# Patient Record
Sex: FEMALE | Race: WHITE | NOT HISPANIC OR LATINO | Employment: FULL TIME | URBAN - METROPOLITAN AREA
[De-identification: names, ages, dates, MRNs, and addresses within clinical notes are randomized per-mention and may not be internally consistent; named-entity substitution may affect disease eponyms.]

---

## 2021-05-25 ENCOUNTER — RECORDS - HEALTHEAST (OUTPATIENT)
Dept: ADMINISTRATIVE | Facility: CLINIC | Age: 36
End: 2021-05-25

## 2023-02-27 ENCOUNTER — THERAPY VISIT (OUTPATIENT)
Dept: PHYSICAL THERAPY | Facility: CLINIC | Age: 38
End: 2023-02-27
Payer: COMMERCIAL

## 2023-02-27 DIAGNOSIS — M25.551 HIP PAIN, RIGHT: Primary | ICD-10-CM

## 2023-02-27 DIAGNOSIS — S76.319A HAMSTRING MUSCLE STRAIN: ICD-10-CM

## 2023-02-27 PROCEDURE — 97530 THERAPEUTIC ACTIVITIES: CPT | Mod: GP | Performed by: PHYSICAL THERAPIST

## 2023-02-27 PROCEDURE — 97161 PT EVAL LOW COMPLEX 20 MIN: CPT | Mod: GP | Performed by: PHYSICAL THERAPIST

## 2023-02-27 PROCEDURE — 97110 THERAPEUTIC EXERCISES: CPT | Mod: GP | Performed by: PHYSICAL THERAPIST

## 2023-02-27 NOTE — PROGRESS NOTES
"Physical Therapy Initial Evaluation  Subjective:  The history is provided by the patient. No  was used.   Patient Health History  Marnie Arzola being seen for Patient presents as a self-referral with primary complaint of righthip and bilateral hamstring pain. She previously went to PT online and was able to progress back to running over the summer but reaggravated the hamstrings and hip pain continues. Pain can come on as quickly as 1-2 minutes into running in both hip and hamstrings..     Problem began: 7/1/2022.   Problem occurred: Running injury, ongoing with previous occurrences    Pain is reported as 2/10 on pain scale.  General health as reported by patient is good.  Pertinent medical history includes: overweight.   Red flags:  None as reported by patient.     Surgeries include:  Other. Other surgery history details: breast reduction.    Current medications:  Other. Other medications details: birth control.    Current occupation is .                     Therapist Generated HPI Evaluation  Problem details: Patient is an avid runner, enjoys walking, biking and hiking and does strength training in the evenings (started a few months ago). She only has pain with running and walking.    0-1 pain level at rest  2-4/10 with movement this week.         Type of problem:  Right hip.    This is a chronic condition.  Condition occurred with:  Running.  Where condition occurred: during recreation/sport.  Patient reports pain:  Greater trochanter and lateral.  Pain quality: grinding, \"inflamed\"   Pain radiates to:  No radiation. Pain timing: worse with activity.  Since onset symptoms are unchanged.  Associated symptoms:  Loss of motion/stiffness. Symptoms are exacerbated by running      Previous treatment includes physical therapy (online PT). There was moderate improvement following previous treatment.  Restrictions due to condition include:  Working in normal job without " restrictions.  Barriers include:  None as reported by patient.                        Objective:  System                                           Hip Evaluation  HIP AROM:  AROM:    Left Hip:     Normal    Right Hip:   Normal (mild anterior hip pain with IR (PROM>AROM); all other AROM normal)                      Hip Special Testing:      Right hip negative for the following special tests:  Piriformis; Bravo or Fadir/Labrum    Hip Palpation:      Right hip tenderness present at:  ASIS; Abductors and Gluteus Medius  Right hip tenderness not present at:  Ischial Tuberosity; hip flexors or Piriformis  Functional Testing:          Quad:    Single leg squat:   Left:    Mild loss of control  Right:   Moderate loss of control, hip substitution, femoral IR and excessive anterior knee excursion    Bilateral leg squat:  Normal control            Knee Evaluation:              Functional Testing:        Core Strength:    Single Leg Bridge: Left:  12/20 reps    Right: 12/20 reps    % of Uninvolved:             General     ROS    Assessment/Plan:    Patient is a 37 year old female with right side hip complaints.    Patient has the following significant findings with corresponding treatment plan.                Diagnosis 1:  R hip pain, gluteal tendinopathy vs. Hip bursitis, bilateral hamstring pain  Pain -  hot/cold therapy, manual therapy, splint/taping/bracing/orthotics, self management, education and home program  Decreased ROM/flexibility - manual therapy, therapeutic exercise and home program  Decreased joint mobility - manual therapy, therapeutic exercise and home program  Decreased strength - therapeutic exercise, therapeutic activities and home program  Impaired muscle performance - neuro re-education and home program  Decreased function - therapeutic activities and home program    Therapy Evaluation Codes:   Cumulative Therapy Evaluation is: Low complexity.    Previous and current functional limitations:  (See Goal Flow  Sheet for this information)    Short term and Long term goals: (See Goal Flow Sheet for this information)     Communication ability:  Patient appears to be able to clearly communicate and understand verbal and written communication and follow directions correctly.  Treatment Explanation - The following has been discussed with the patient:   RX ordered/plan of care  Anticipated outcomes  Possible risks and side effects  This patient would benefit from PT intervention to resume normal activities.   Rehab potential is good.    Frequency:  2 X a month, once daily  Duration:  for 6 visits  Discharge Plan:  Achieve all LTG.  Independent in home treatment program.  Reach maximal therapeutic benefit.    Please refer to the daily flowsheet for treatment today, total treatment time and time spent performing 1:1 timed codes.

## 2023-04-12 ENCOUNTER — THERAPY VISIT (OUTPATIENT)
Dept: PHYSICAL THERAPY | Facility: CLINIC | Age: 38
End: 2023-04-12
Payer: COMMERCIAL

## 2023-04-12 DIAGNOSIS — M25.551 HIP PAIN, RIGHT: Primary | ICD-10-CM

## 2023-04-12 DIAGNOSIS — S76.319A HAMSTRING MUSCLE STRAIN: ICD-10-CM

## 2023-04-12 PROCEDURE — 97535 SELF CARE MNGMENT TRAINING: CPT | Mod: GP | Performed by: PHYSICAL THERAPIST

## 2023-04-12 PROCEDURE — 97110 THERAPEUTIC EXERCISES: CPT | Mod: GP | Performed by: PHYSICAL THERAPIST

## 2023-04-27 ENCOUNTER — THERAPY VISIT (OUTPATIENT)
Dept: PHYSICAL THERAPY | Facility: CLINIC | Age: 38
End: 2023-04-27
Payer: COMMERCIAL

## 2023-04-27 DIAGNOSIS — S76.319A HAMSTRING MUSCLE STRAIN: ICD-10-CM

## 2023-04-27 DIAGNOSIS — M25.551 HIP PAIN, RIGHT: Primary | ICD-10-CM

## 2023-04-27 PROCEDURE — 97110 THERAPEUTIC EXERCISES: CPT | Mod: GP | Performed by: PHYSICAL THERAPIST

## 2023-04-27 PROCEDURE — 97112 NEUROMUSCULAR REEDUCATION: CPT | Mod: GP | Performed by: PHYSICAL THERAPIST

## 2023-05-10 ENCOUNTER — THERAPY VISIT (OUTPATIENT)
Dept: PHYSICAL THERAPY | Facility: CLINIC | Age: 38
End: 2023-05-10
Payer: COMMERCIAL

## 2023-05-10 DIAGNOSIS — S76.319A HAMSTRING MUSCLE STRAIN: ICD-10-CM

## 2023-05-10 DIAGNOSIS — M25.551 HIP PAIN, RIGHT: Primary | ICD-10-CM

## 2023-05-10 PROCEDURE — 97110 THERAPEUTIC EXERCISES: CPT | Mod: GP | Performed by: PHYSICAL THERAPIST

## 2023-05-10 PROCEDURE — 97112 NEUROMUSCULAR REEDUCATION: CPT | Mod: GP | Performed by: PHYSICAL THERAPIST

## 2023-05-24 ENCOUNTER — THERAPY VISIT (OUTPATIENT)
Dept: PHYSICAL THERAPY | Facility: CLINIC | Age: 38
End: 2023-05-24
Payer: COMMERCIAL

## 2023-05-24 DIAGNOSIS — S76.319A HAMSTRING MUSCLE STRAIN: ICD-10-CM

## 2023-05-24 DIAGNOSIS — M25.551 HIP PAIN, RIGHT: Primary | ICD-10-CM

## 2023-05-24 PROCEDURE — 97140 MANUAL THERAPY 1/> REGIONS: CPT | Mod: GP | Performed by: PHYSICAL THERAPIST

## 2023-05-24 PROCEDURE — 97110 THERAPEUTIC EXERCISES: CPT | Mod: GP | Performed by: PHYSICAL THERAPIST

## 2023-05-24 NOTE — PROGRESS NOTES
DISCHARGE  Reason for Discharge: Noting improvements in strength and reduction of symptoms. Demonstrating increased activity tolerance and ability to return to running 3 miles. Due to self referral, at 90 day limit and plan to discharge to [facility] with anticipation of ongoing improvement with HEP compliance.     Equipment Issued: na    Discharge Plan: Patient to continue home program.  F/up with sports med if symptoms fail to continue to improve.     Referring Provider:  Referred Self       05/24/23 0500   Appointment Info   Signing clinician's name / credentials Mavis Luna PT DPT   Total/Authorized Visits 6   Visits Used 5   PT Tx Diagnosis R hip pain, gluteal tendinopathy vs. Hip bursitis, bilateral hamstring pain   Quick Adds Self Referred   Self Referred   Self Referred (PT) Yes   MD order needed by: 5/28/2023   Progress Note/Certification   Start of Care Date 02/27/23   Onset of illness/injury or Date of Surgery 07/01/22   Therapy Frequency 2x/month   Predicted Duration 6 visits   PT Goal 1   Goal Identifier ambulation   Goal Description pt will be able to walk 3 miles wtihout increased symptoms to allow return to running   Goal Progress able to walk, run up to 3 miles. further increasing mileage beyond 3.5/4 is causing some symptoms at this time   Target Date 05/24/23   Date Met 05/24/23   Subjective Report   Subjective Report Notes that can do 3 mile runs without increased symptoms. Notes that with trying to up mileage to 3.5/4 miles would start to feel hip pain again. Not to significance that has previously had but still present. Continues to run 2-3 miles.   Objective Measures   Objective Measures Objective Measure 1;Objective Measure 2   Objective Measure 1   Objective Measure MMT strength   Details Hip abduction 4+/5 B, extension 4+/5 B, ER R 4/5, L 4+/5   Objective Measure 2   Objective Measure LEFS   Details 70/80 (initial 34)   Treatment Interventions (PT)   Interventions Therapeutic  Procedure/Exercise;Manual Therapy   Therapeutic Procedure/Exercise   PTRx Ther Proc 1 Dead lift   PTRx Ther Proc 1 - Details DL and SL, added free weight. cueing techniques   PTRx Ther Proc 2 Supine hamstring resisted extension   PTRx Ther Proc 2 - Details green TB   PTRx Ther Proc 3 Modified hamstring curl   PTRx Ther Proc 3 - Details prone, standing, variation   PTRx Ther Proc 4 resisted knee felxion/eccentric extension   PTRx Ther Proc 4 - Details w/ green TB standing   Therapeutic Procedures: strength, endurance, ROM, flexibillity minutes (01418) 32   Ther Proc 1 HEP   Ther Proc 1 - Details Comprehensive review of prior HEP, with edu on parameters, duration, frequency, progressions and timing. Discuss continued management of symptoms, ongoing strengthening, consider running eval vs f/u w/ sports med if ongoing symptoms despite improvement overall in strength and activity tolerance   Skilled Intervention mobility, strength to facilitate home management, improved activity toelrance and return to desired activities   Patient Response/Progress I HEP   Manual Therapy   Manual Therapy: Mobilization, MFR, MLD, friction massage minutes (38200) 8   Manual Therapy 1 MT   Manual Therapy 1 - Details tool assisted STM to R hamstring muscle belly per tolerance, pt prone. edu performance at home, show tools to utilzie such as foam roller v self massage stick etc.   Skilled Intervention mt improve mobility, less pain   Patient Response/Progress tolerates well, noting improvement symptosm   Plan   Home program see above/PTRX   Updates to plan of care discharge, continue HEP. f/u w/ sports med if symptosm fail to further improve (need referral for continued PT due to self referral 90 day limit)   Total Session Time   Timed Code Treatment Minutes 40   Total Treatment Time (sum of timed and untimed services) 40

## 2023-10-19 ENCOUNTER — LAB REQUISITION (OUTPATIENT)
Dept: LAB | Facility: CLINIC | Age: 38
End: 2023-10-19
Payer: COMMERCIAL

## 2023-10-19 PROCEDURE — 87070 CULTURE OTHR SPECIMN AEROBIC: CPT | Mod: ORL | Performed by: PHYSICIAN ASSISTANT

## 2023-10-19 PROCEDURE — 87077 CULTURE AEROBIC IDENTIFY: CPT | Mod: ORL | Performed by: PHYSICIAN ASSISTANT

## 2023-10-21 LAB
BACTERIA SPEC CULT: ABNORMAL
BACTERIA SPEC CULT: ABNORMAL

## 2023-12-06 ENCOUNTER — LAB REQUISITION (OUTPATIENT)
Dept: LAB | Facility: CLINIC | Age: 38
End: 2023-12-06

## 2023-12-06 DIAGNOSIS — Z13.29 ENCOUNTER FOR SCREENING FOR OTHER SUSPECTED ENDOCRINE DISORDER: ICD-10-CM

## 2023-12-06 DIAGNOSIS — Z13.1 ENCOUNTER FOR SCREENING FOR DIABETES MELLITUS: ICD-10-CM

## 2023-12-06 DIAGNOSIS — Z13.220 ENCOUNTER FOR SCREENING FOR LIPOID DISORDERS: ICD-10-CM

## 2023-12-06 LAB — HBA1C MFR BLD: 5.7 %

## 2023-12-06 PROCEDURE — 84443 ASSAY THYROID STIM HORMONE: CPT | Performed by: OBSTETRICS & GYNECOLOGY

## 2023-12-06 PROCEDURE — 80061 LIPID PANEL: CPT | Performed by: OBSTETRICS & GYNECOLOGY

## 2023-12-06 PROCEDURE — 83036 HEMOGLOBIN GLYCOSYLATED A1C: CPT | Performed by: OBSTETRICS & GYNECOLOGY

## 2023-12-07 LAB
CHOLEST SERPL-MCNC: 230 MG/DL
FASTING STATUS PATIENT QL REPORTED: NO
HDLC SERPL-MCNC: 60 MG/DL
LDLC SERPL CALC-MCNC: 149 MG/DL
NONHDLC SERPL-MCNC: 170 MG/DL
TRIGL SERPL-MCNC: 104 MG/DL
TSH SERPL DL<=0.005 MIU/L-ACNC: 2.98 UIU/ML (ref 0.3–4.2)

## 2025-03-04 ENCOUNTER — TRANSFERRED RECORDS (OUTPATIENT)
Dept: HEALTH INFORMATION MANAGEMENT | Facility: CLINIC | Age: 40
End: 2025-03-04
Payer: COMMERCIAL

## 2025-03-11 ENCOUNTER — TRANSFERRED RECORDS (OUTPATIENT)
Dept: HEALTH INFORMATION MANAGEMENT | Facility: CLINIC | Age: 40
End: 2025-03-11
Payer: COMMERCIAL

## 2025-03-12 ENCOUNTER — MEDICAL CORRESPONDENCE (OUTPATIENT)
Dept: HEALTH INFORMATION MANAGEMENT | Facility: CLINIC | Age: 40
End: 2025-03-12
Payer: COMMERCIAL

## 2025-03-21 ENCOUNTER — ORDERS ONLY (AUTO-RELEASED) (OUTPATIENT)
Dept: CARDIOLOGY | Facility: CLINIC | Age: 40
End: 2025-03-21

## 2025-03-21 DIAGNOSIS — R00.2 PALPITATIONS: ICD-10-CM

## 2025-04-23 LAB — CV ZIO PRELIM RESULTS: NORMAL

## 2025-04-24 ENCOUNTER — HOSPITAL ENCOUNTER (OUTPATIENT)
Dept: CARDIOLOGY | Facility: HOSPITAL | Age: 40
Discharge: HOME | End: 2025-04-24
Attending: INTERNAL MEDICINE
Payer: COMMERCIAL

## 2025-04-24 DIAGNOSIS — R00.2 PALPITATIONS: ICD-10-CM

## 2025-04-24 LAB — LVEF ECHO: NORMAL

## 2025-04-24 PROCEDURE — 93306 TTE W/DOPPLER COMPLETE: CPT
